# Patient Record
Sex: MALE | Race: WHITE | NOT HISPANIC OR LATINO | URBAN - METROPOLITAN AREA
[De-identification: names, ages, dates, MRNs, and addresses within clinical notes are randomized per-mention and may not be internally consistent; named-entity substitution may affect disease eponyms.]

---

## 2023-08-10 ENCOUNTER — HOSPITAL ENCOUNTER (OUTPATIENT)
Dept: DATA CONVERSION | Facility: HOSPITAL | Age: 68
Discharge: HOME | End: 2023-08-10

## 2023-08-10 DIAGNOSIS — Z12.5 ENCOUNTER FOR SCREENING FOR MALIGNANT NEOPLASM OF PROSTATE: ICD-10-CM

## 2023-08-10 DIAGNOSIS — Z13.220 ENCOUNTER FOR SCREENING FOR LIPOID DISORDERS: ICD-10-CM

## 2023-08-10 DIAGNOSIS — Z13.6 ENCOUNTER FOR SCREENING FOR CARDIOVASCULAR DISORDERS: ICD-10-CM

## 2023-08-10 DIAGNOSIS — Z13.1 ENCOUNTER FOR SCREENING FOR DIABETES MELLITUS: ICD-10-CM

## 2023-08-10 LAB
ALBUMIN SERPL-MCNC: 4.2 GM/DL (ref 3.5–5)
ALBUMIN/GLOB SERPL: 1.7 RATIO (ref 1.5–3)
ALP BLD-CCNC: 62 U/L (ref 35–125)
ALT SERPL-CCNC: 17 U/L (ref 5–40)
ANION GAP SERPL CALCULATED.3IONS-SCNC: 10 MMOL/L (ref 0–19)
APPEARANCE PLAS: CLEAR
AST SERPL-CCNC: 17 U/L (ref 5–40)
BILIRUB SERPL-MCNC: 0.8 MG/DL (ref 0.1–1.2)
BUN SERPL-MCNC: 17 MG/DL (ref 8–25)
BUN/CREAT SERPL: 15.5 RATIO (ref 8–21)
CALCIUM SERPL-MCNC: 9.8 MG/DL (ref 8.5–10.4)
CHLORIDE SERPL-SCNC: 104 MMOL/L (ref 97–107)
CHOLEST SERPL-MCNC: 205 MG/DL (ref 133–200)
CHOLEST/HDLC SERPL: 3.5 RATIO
CO2 SERPL-SCNC: 27 MMOL/L (ref 24–31)
COLOR SPUN FLD: YELLOW
CREAT SERPL-MCNC: 1.1 MG/DL (ref 0.4–1.6)
FASTING STATUS PATIENT QL REPORTED: ABNORMAL
GFR SERPL CREATININE-BSD FRML MDRD: 74 ML/MIN/1.73 M2
GLOBULIN SER-MCNC: 2.5 G/DL (ref 1.9–3.7)
GLUCOSE SERPL-MCNC: 90 MG/DL (ref 65–99)
HDLC SERPL-MCNC: 58 MG/DL
LDLC SERPL CALC-MCNC: 132 MG/DL (ref 65–130)
POTASSIUM SERPL-SCNC: 4.5 MMOL/L (ref 3.4–5.1)
PROT SERPL-MCNC: 6.7 G/DL (ref 5.9–7.9)
PSA SERPL-MCNC: 2.1 NG/ML (ref 0–4.1)
SODIUM SERPL-SCNC: 141 MMOL/L (ref 133–145)
TRIGL SERPL-MCNC: 75 MG/DL (ref 40–150)

## 2023-09-12 ENCOUNTER — HOSPITAL ENCOUNTER (OUTPATIENT)
Dept: DATA CONVERSION | Facility: HOSPITAL | Age: 68
Discharge: HOME | End: 2023-09-12
Payer: MEDICARE

## 2023-09-12 DIAGNOSIS — E78.00 PURE HYPERCHOLESTEROLEMIA, UNSPECIFIED: ICD-10-CM

## 2024-03-21 ASSESSMENT — ENCOUNTER SYMPTOMS
WEIGHT LOSS: 0
SHORTNESS OF BREATH: 0
FEVER: 0
SORE THROAT: 0
WHEEZING: 0
MYALGIAS: 0
HEMOPTYSIS: 0
RHINORRHEA: 0
COUGH: 1
HEARTBURN: 0
CHILLS: 0
HEADACHES: 0
SWEATS: 0

## 2024-03-22 ENCOUNTER — OFFICE VISIT (OUTPATIENT)
Dept: PRIMARY CARE | Facility: CLINIC | Age: 69
End: 2024-03-22
Payer: MEDICARE

## 2024-03-22 ENCOUNTER — HOSPITAL ENCOUNTER (OUTPATIENT)
Dept: RADIOLOGY | Facility: HOSPITAL | Age: 69
Discharge: HOME | End: 2024-03-22
Payer: MEDICARE

## 2024-03-22 VITALS
BODY MASS INDEX: 25.31 KG/M2 | HEART RATE: 64 BPM | WEIGHT: 189.2 LBS | OXYGEN SATURATION: 97 % | DIASTOLIC BLOOD PRESSURE: 70 MMHG | SYSTOLIC BLOOD PRESSURE: 134 MMHG

## 2024-03-22 DIAGNOSIS — R05.2 SUBACUTE COUGH: Primary | ICD-10-CM

## 2024-03-22 DIAGNOSIS — R05.2 SUBACUTE COUGH: ICD-10-CM

## 2024-03-22 PROBLEM — E78.00 PURE HYPERCHOLESTEROLEMIA: Status: ACTIVE | Noted: 2024-03-22

## 2024-03-22 PROBLEM — F41.8 DEPRESSION WITH ANXIETY: Status: ACTIVE | Noted: 2024-03-22

## 2024-03-22 PROBLEM — H00.016 HORDEOLUM EXTERNUM OF LEFT EYE: Status: ACTIVE | Noted: 2024-03-22

## 2024-03-22 PROBLEM — Z78.9 MEDICAL HOME PATIENT: Status: ACTIVE | Noted: 2024-03-22

## 2024-03-22 PROCEDURE — 99213 OFFICE O/P EST LOW 20 MIN: CPT | Performed by: INTERNAL MEDICINE

## 2024-03-22 PROCEDURE — 1126F AMNT PAIN NOTED NONE PRSNT: CPT | Performed by: INTERNAL MEDICINE

## 2024-03-22 PROCEDURE — 71046 X-RAY EXAM CHEST 2 VIEWS: CPT | Performed by: RADIOLOGY

## 2024-03-22 PROCEDURE — 1159F MED LIST DOCD IN RCRD: CPT | Performed by: INTERNAL MEDICINE

## 2024-03-22 PROCEDURE — 1036F TOBACCO NON-USER: CPT | Performed by: INTERNAL MEDICINE

## 2024-03-22 PROCEDURE — 1157F ADVNC CARE PLAN IN RCRD: CPT | Performed by: INTERNAL MEDICINE

## 2024-03-22 PROCEDURE — 71046 X-RAY EXAM CHEST 2 VIEWS: CPT

## 2024-03-22 RX ORDER — LAMOTRIGINE 25 MG/1
25 TABLET, ORALLY DISINTEGRATING ORAL DAILY
COMMUNITY

## 2024-03-22 RX ORDER — ALBUTEROL SULFATE 90 UG/1
2 AEROSOL, METERED RESPIRATORY (INHALATION) EVERY 4 HOURS
COMMUNITY
Start: 2023-08-08

## 2024-03-22 RX ORDER — DULOXETIN HYDROCHLORIDE 30 MG/1
30 CAPSULE, DELAYED RELEASE ORAL DAILY
COMMUNITY

## 2024-03-22 RX ORDER — MINERAL OIL
180 ENEMA (ML) RECTAL DAILY
COMMUNITY
Start: 2023-08-08

## 2024-03-22 ASSESSMENT — ENCOUNTER SYMPTOMS
LOSS OF SENSATION IN FEET: 0
COUGH: 0
CHILLS: 0
SHORTNESS OF BREATH: 0
UNEXPECTED WEIGHT CHANGE: 0
WHEEZING: 0
FEVER: 0
OCCASIONAL FEELINGS OF UNSTEADINESS: 0
SINUS PAIN: 0
RHINORRHEA: 1
DEPRESSION: 0
FATIGUE: 0
CHOKING: 0
PALPITATIONS: 0

## 2024-03-22 ASSESSMENT — PAIN SCALES - GENERAL: PAINLEVEL: 0-NO PAIN

## 2024-03-22 ASSESSMENT — PATIENT HEALTH QUESTIONNAIRE - PHQ9
2. FEELING DOWN, DEPRESSED OR HOPELESS: NOT AT ALL
1. LITTLE INTEREST OR PLEASURE IN DOING THINGS: NOT AT ALL
SUM OF ALL RESPONSES TO PHQ9 QUESTIONS 1 AND 2: 0

## 2024-03-22 NOTE — PROGRESS NOTES
Subjective   Patient ID: Yovani Bledsoe is a 69 y.o. male who presents for ONGOING COUGH.    Cough--went away with treatment last year but in last month and a half has come back.  Worse at night. Has been trying otc allergy pill but not the inhaler. Neg for covid yesterday. No other URI symptoms than cough. Denies SOB or wheezing.  Began before went to [place] , continued in [place] and since returned has ocntinued. Went to  in [place] and told him to get steroid NS--didn't do CXR.  No pain in calves or swelling ankles. No change in weight. Denies any heartburn symptoms. Denies palpitations.           Review of Systems   Constitutional:  Negative for chills, fatigue, fever and unexpected weight change.   HENT:  Positive for rhinorrhea. Negative for congestion and sinus pain.    Respiratory:  Negative for cough, choking, shortness of breath and wheezing.    Cardiovascular:  Negative for chest pain, palpitations and leg swelling.       Objective   /70 (BP Location: Left arm, Patient Position: Lying)   Pulse 64   Wt 85.8 kg (189 lb 3.2 oz)   SpO2 97%   BMI 25.31 kg/m²     Physical Exam  Constitutional:       General: He is not in acute distress.     Appearance: Normal appearance. He is normal weight. He is not ill-appearing.   HENT:      Right Ear: Tympanic membrane normal.      Left Ear: Tympanic membrane normal.      Nose: No congestion or rhinorrhea.      Mouth/Throat:      Pharynx: Oropharynx is clear.   Cardiovascular:      Rate and Rhythm: Normal rate and regular rhythm.      Heart sounds: No murmur heard.  Pulmonary:      Effort: Pulmonary effort is normal.      Breath sounds: Normal breath sounds. No wheezing, rhonchi or rales.   Musculoskeletal:      Right lower leg: No edema.      Left lower leg: No edema.   Lymphadenopathy:      Cervical: No cervical adenopathy.         Assessment/Plan   Diagnoses and all orders for this visit:  Subacute cough  -     XR chest 2 views; Future    Advised resuming  allegra daily and trying albuterol inhaler before bed. Further plans after xray available

## 2024-09-30 ENCOUNTER — HOSPITAL ENCOUNTER (OUTPATIENT)
Dept: RADIOLOGY | Facility: HOSPITAL | Age: 69
Discharge: HOME | End: 2024-09-30
Payer: MEDICARE

## 2024-09-30 DIAGNOSIS — M75.122 COMPLETE ROTATOR CUFF TEAR OR RUPTURE OF LEFT SHOULDER, NOT SPECIFIED AS TRAUMATIC: ICD-10-CM

## 2024-09-30 PROCEDURE — 73221 MRI JOINT UPR EXTREM W/O DYE: CPT | Mod: LT

## 2024-09-30 PROCEDURE — 73221 MRI JOINT UPR EXTREM W/O DYE: CPT | Mod: LEFT SIDE | Performed by: RADIOLOGY

## 2024-10-24 NOTE — PROGRESS NOTES
Physical Therapy Evaluation and Treatment     Patient Name: Yovani Bledsoe  MRN: [id number]  Encounter date: 10/25/2024  Time Calculation  Start Time: 0930  Stop Time: 0959  Time Calculation (min): 29 min  PT Evaluation Time Entry  PT Evaluation (Low) Time Entry: 20  PT Therapeutic Procedures Time Entry  Therapeutic Activity Time Entry: 9  Low complexity due to patient's clinical presentation being stable and uncomplicated by any significant comorbidities that may affect rehab tolerance and progression.     Visit # 1 of 9  Visits/Dates Authorized: MEDICARE A/B - NO AUTH / $240 DEDUCT MET / 80% Coins / [place] visits / $0 used 2024 PT/ST.   Insurance Type: Payor: MEDICARE / Plan: MEDICARE PART A AND B / Product Type: *No Product type* /     Current Problem:   Problem List Items Addressed This Visit    None  Visit Diagnoses         Codes    Chronic left shoulder pain    -  Primary M25.512, G89.29    Relevant Orders    Follow Up In Physical Therapy    Unspecified rotator cuff tear or rupture of left shoulder, not specified as traumatic     M75.102    Relevant Orders    Follow Up In Physical Therapy    Tear of left rotator cuff, unspecified tear extent, unspecified whether traumatic     M75.102          Precautions:  Precautions  Precautions Comment: None       Subjective    Subjective Evaluation    History of Present Illness  Date of onset: 12/1/2022  Mechanism of injury: Pt presents to therapy with L shoulder pain, suspected RTC tear who recent Mri did not demonstrate any acute tears however tendinosis of bicep and supraspinatus is present. Pt reports he hurt it playing basketball in December of 2022, he was reaching up and his arm was pulled back from another player. He thought it was just hyperextended but the arm has gotten progressively weak and uncomfortable. He would like to get back to weight training and overall build strength. Denies paresthesia. He is still playing pickle ball 3-4x per week but only uses his  right arm.     Quality of life: good    Pain  Current pain ratin  At best pain ratin  At worst pain ratin  Location: L shoulder  Quality: dull ache, tight and pulling    Hand dominance: right    Diagnostic Tests  MRI studies: normal (IMPRESSION: Mild biceps tendinosis.    Mild acromioclavicular osteoarthritis with some mild subacromial subdeltoid bursitis.    Mild supraspinatus tendinosis.)    Treatments  No previous or current treatments  Patient Goals  Patient goals for therapy: increased strength, decreased pain, return to sport/leisure activities and increased motion  Patient goal: Return to weight training, pickle ball and general sports activities         Objective      Objective     Postural Observations  Seated posture: fair    Additional Postural Observation Details  Forward shoulder position     Palpation   Left   Tenderness of the biceps.   Trigger point to biceps.     Active Range of Motion   Left Shoulder   Flexion: 125 degrees with pain  Abduction: 130 degrees with pain  External rotation 0°: 40 degrees with pain  External rotation BTH: C5   Internal rotation 0°: WFL  Internal rotation BTB: L1 with pain    Passive Range of Motion   Left Shoulder   Normal passive range of motion    Additional Passive Range of Motion Details  Grossly equal to AROM     Scapular Mobility   Left Shoulder   Scapular mobility: WFL  Scapular Mobility with Shoulder to 90° FF   Scapular winging: minimal  Scapular elevation: minimal    Joint Play     Additional Joint Play Details  WNL    Strength/Myotome Testing     Left Shoulder     Planes of Motion   Flexion: WFL   Extension: WFL   Abduction: WFL   External rotation at 90°: WFL   Internal rotation at 90°: WFL     Isolated Muscles   Lower trapezius: 4+   Middle trapezius: 4+     Additional Strength Details  Pain with ER/IR testing however good muscle contraction      Outcome Measures:  Other Measures  Other Outcome Measures: UEFS 65     Treatments:  Therapeutic  Activity  Therapeutic Activity Performed: Yes  Therapeutic Activity 1: Educated on anatomy in relation to findings  Therapeutic Activity 2: Educated on PT interventions including KT tape, Dry needling and modalities such as UV light    HEP / Access Codes:   Access Code: [email or web address]  URL: [email or web address]  Date: 10/25/2024  Prepared by: Mahi Meléndez    Exercises  - Seated Scapular Retraction  - 1 x daily - 7 x weekly - 3 sets - 10 reps  - Prone Shoulder Horizontal Abduction  - 1 x daily - 7 x weekly - 3 sets - 10 reps  - Prone Shoulder Flexion  - 1 x daily - 7 x weekly - 3 sets - 10 reps  - Shoulder External Rotation with Anchored Resistance  - 1 x daily - 7 x weekly - 3 sets - 10 reps  - Standing Shoulder Row with Anchored Resistance  - 1 x daily - 7 x weekly - 3 sets - 10 reps    Assessment   Assessment & Plan     Assessment  Impairments: abnormal coordination, abnormal muscle tone, abnormal or restricted ROM, activity intolerance, impaired physical strength, lacks appropriate home exercise program and pain with function  Assessment details: Pt is a 69 y.o male presenting to therapy with L shoulder pain. Pt demonstrated functional ROM however limits and pain were noted at current end range with flexion and abduction along with palpable tenderness along proximal bicep which does align with MRI findings of chronic tendinosis. Pt also demonstrated reduction in postural and posterior chain strength with slight scapular winging and forward shoulder position creating further compression through anterior shoulder structures. At this time pt would benefit from skilled physical therapy in order to prevent further functional decline.   Barriers to therapy: n/a  Prognosis: good  Prognosis details: Good activity level     Goals  Return to weight training, pickle ball and general sports activities    Plan  Therapy options: will be seen for skilled physical therapy services  Planned modality interventions: low level  laser therapy and TENS  Other planned modality interventions: KT tape/Dry needling  Planned therapy interventions: manual therapy, abdominal trunk stabilization, motor coordination training, muscle pump exercises, neuromuscular re-education, body mechanics training, postural training, soft tissue mobilization, fine motor coordination training, flexibility, strengthening, functional ROM exercises, gait training, stretching, spinal/joint mobilization, therapeutic activities, home exercise program and joint mobilization  Frequency: 1x week  Duration in visits: 8  Duration in weeks: 9  Treatment plan discussed with: patient           Goals:   Active       LTG       Pt will be 100% IND with HEP in 8 weeks in order to maintain progress with therapy.         Start:  10/25/24    Expected End:  12/29/24            Pt will reduce pain levels to no more than 0/10 in 8 weeks in order to return to recreational activities.         Start:  10/25/24    Expected End:  12/29/24            Pt will demonstrate good postural and RTC control with return to recreational activities including basketball, pickle ball, weight lifting and kayaking in 8 weeks to prevent reoccurrence of injury       Start:  10/25/24    Expected End:  12/29/24            Pt will demonstrate subjective improvement of ADLs and recreational activities through improved score of 80 on UEFS in 8 weeks for return to recreational activities. .        Start:  10/25/24    Expected End:  12/29/24               STG       Pt will be 50% IND with HEP in 4 weeks in order to progress with therapy.        Start:  10/25/24    Expected End:  11/29/24            Pt will demonstrated improve AROM of L GH as follows: equal to R in 4 weeks to improve mobility required for dressing, bathing, cooking and cleaning.        Start:  10/25/24    Expected End:  11/29/24            Pt will improve R GH and scapular strength to 5/5 in 4 weeks in order to improve strength required to lift objects  at home including groceries and to improve cleaning tasks.         Start:  10/25/24    Expected End:  11/29/24            Pt will demonstrate subjective improvement of ADLs and recreational activities through improved score of 75 on UEFS in 4 weeks for gentle return to recreational sport activities.        Start:  10/25/24    Expected End:  11/29/24

## 2024-10-25 ENCOUNTER — EVALUATION (OUTPATIENT)
Dept: PHYSICAL THERAPY | Facility: CLINIC | Age: 69
End: 2024-10-25
Payer: MEDICARE

## 2024-10-25 DIAGNOSIS — M75.102 TEAR OF LEFT ROTATOR CUFF, UNSPECIFIED TEAR EXTENT, UNSPECIFIED WHETHER TRAUMATIC: ICD-10-CM

## 2024-10-25 DIAGNOSIS — G89.29 CHRONIC LEFT SHOULDER PAIN: Primary | ICD-10-CM

## 2024-10-25 DIAGNOSIS — M25.512 CHRONIC LEFT SHOULDER PAIN: Primary | ICD-10-CM

## 2024-10-25 DIAGNOSIS — M75.102 UNSPECIFIED ROTATOR CUFF TEAR OR RUPTURE OF LEFT SHOULDER, NOT SPECIFIED AS TRAUMATIC: ICD-10-CM

## 2024-10-25 PROCEDURE — 97161 PT EVAL LOW COMPLEX 20 MIN: CPT | Mod: GP | Performed by: PHYSICAL THERAPIST

## 2024-10-25 PROCEDURE — 97530 THERAPEUTIC ACTIVITIES: CPT | Mod: GP | Performed by: PHYSICAL THERAPIST

## 2024-10-25 SDOH — ECONOMIC STABILITY: GENERAL: QUALITY OF LIFE: GOOD

## 2024-10-25 ASSESSMENT — ENCOUNTER SYMPTOMS
PAIN SCALE AT LOWEST: 0
PAIN SCALE: 0
QUALITY: DULL ACHE
QUALITY: TIGHT
PAIN SCALE AT HIGHEST: 5
PAIN LOCATION: L SHOULDER
QUALITY: PULLING

## 2024-10-28 ENCOUNTER — TREATMENT (OUTPATIENT)
Dept: PHYSICAL THERAPY | Facility: CLINIC | Age: 69
End: 2024-10-28
Payer: MEDICARE

## 2024-10-28 DIAGNOSIS — G89.29 CHRONIC LEFT SHOULDER PAIN: ICD-10-CM

## 2024-10-28 DIAGNOSIS — M75.102 NONTRAUMATIC TEAR OF LEFT ROTATOR CUFF, UNSPECIFIED TEAR EXTENT: Primary | ICD-10-CM

## 2024-10-28 DIAGNOSIS — M75.102 UNSPECIFIED ROTATOR CUFF TEAR OR RUPTURE OF LEFT SHOULDER, NOT SPECIFIED AS TRAUMATIC: ICD-10-CM

## 2024-10-28 DIAGNOSIS — M75.102 TEAR OF LEFT ROTATOR CUFF, UNSPECIFIED TEAR EXTENT, UNSPECIFIED WHETHER TRAUMATIC: ICD-10-CM

## 2024-10-28 DIAGNOSIS — M25.512 CHRONIC LEFT SHOULDER PAIN: ICD-10-CM

## 2024-10-28 PROCEDURE — 97140 MANUAL THERAPY 1/> REGIONS: CPT | Mod: GP,CQ

## 2024-10-28 PROCEDURE — 97110 THERAPEUTIC EXERCISES: CPT | Mod: GP,CQ

## 2024-11-06 ENCOUNTER — TREATMENT (OUTPATIENT)
Dept: PHYSICAL THERAPY | Facility: CLINIC | Age: 69
End: 2024-11-06
Payer: MEDICARE

## 2024-11-06 DIAGNOSIS — M75.102 NONTRAUMATIC TEAR OF LEFT ROTATOR CUFF, UNSPECIFIED TEAR EXTENT: Primary | ICD-10-CM

## 2024-11-06 DIAGNOSIS — G89.29 CHRONIC LEFT SHOULDER PAIN: ICD-10-CM

## 2024-11-06 DIAGNOSIS — M75.102 UNSPECIFIED ROTATOR CUFF TEAR OR RUPTURE OF LEFT SHOULDER, NOT SPECIFIED AS TRAUMATIC: ICD-10-CM

## 2024-11-06 DIAGNOSIS — M25.512 CHRONIC LEFT SHOULDER PAIN: ICD-10-CM

## 2024-11-06 PROCEDURE — 97110 THERAPEUTIC EXERCISES: CPT | Mod: GP,CQ

## 2024-11-06 PROCEDURE — 97140 MANUAL THERAPY 1/> REGIONS: CPT | Mod: GP,CQ

## 2024-11-06 NOTE — PROGRESS NOTES
Physical Therapy Treatment    Patient Name: Yovani Bledsoe  MRN: [id number]  Today's Date: 11/6/2024  Time Calculation  Start Time: 0930  Stop Time: 1010  Time Calculation (min): 40 min  PT Therapeutic Procedures Time Entry  Manual Therapy Time Entry: 15  Therapeutic Exercise Time Entry: 25    Insurance:  Visit number: 3 of 9  Authorization info: 10/24/24:  MEDICARE A/B - NO AUTH / $240 DEDUCT MET / 80% Coins / [place] visits / $0 used 2024 PT/STJulien ALVARENGA  SUPP - Active / Per RTE / ds    Insurance Type: Payor: MEDICARE / Plan: MEDICARE PART A AND B / Product Type: *No Product type* /     Current Problem   1. Nontraumatic tear of left rotator cuff, unspecified tear extent        2. Unspecified rotator cuff tear or rupture of left shoulder, not specified as traumatic  Follow Up In Physical Therapy      3. Chronic left shoulder pain  Follow Up In Physical Therapy          Subjective   General    Pt reports slight improvement but still having some discomfort along L proximal bicep with outreached arm position.  Precautions:     Pain    0  Post Treatment Pain Level 0    Objective   L anterior scapular tipping  L scapular winging    Treatments:  Therapeutic Exercise:  Bicep curls in supination 2 x 15 #5   Neutral bicep curls 2 x 15 #5  Upper cuts in 90/90 #5  45 degree lat pulls  dark blue TB x 30  SPO #5 x 30     Manual:   DN:  IDN performed this date. Pt educated on risks and benefits of dry needling. Pt provided verbal consent. All universal precautions followed.    3 - 30 mm R proximal bicep    No adverse response. All IDN guidelines and safety protocols followed.      K-taping to promote better scapular positioning.    Assessment   Assessment:    Pt tolerated session without discomfort. Focusing on scapular strengthening.    Plan:    Continue with POC    OP EDUCATION:   Added to HEP.    Goals:   Active       LTG       Pt will be 100% IND with HEP in 8 weeks in order to maintain progress with therapy.         Start:   10/25/24    Expected End:  12/29/24            Pt will reduce pain levels to no more than 0/10 in 8 weeks in order to return to recreational activities.         Start:  10/25/24    Expected End:  12/29/24            Pt will demonstrate good postural and RTC control with return to recreational activities including basketball, pickle ball, weight lifting and kayaking in 8 weeks to prevent reoccurrence of injury       Start:  10/25/24    Expected End:  12/29/24            Pt will demonstrate subjective improvement of ADLs and recreational activities through improved score of 80 on UEFS in 8 weeks for return to recreational activities. .        Start:  10/25/24    Expected End:  12/29/24               STG       Pt will be 50% IND with HEP in 4 weeks in order to progress with therapy.        Start:  10/25/24    Expected End:  11/29/24            Pt will demonstrated improve AROM of L GH as follows: equal to R in 4 weeks to improve mobility required for dressing, bathing, cooking and cleaning.        Start:  10/25/24    Expected End:  11/29/24            Pt will improve R GH and scapular strength to 5/5 in 4 weeks in order to improve strength required to lift objects at home including groceries and to improve cleaning tasks.         Start:  10/25/24    Expected End:  11/29/24            Pt will demonstrate subjective improvement of ADLs and recreational activities through improved score of 75 on UEFS in 4 weeks for gentle return to recreational sport activities.        Start:  10/25/24    Expected End:  11/29/24

## 2024-11-12 ENCOUNTER — OFFICE VISIT (OUTPATIENT)
Dept: PRIMARY CARE | Facility: CLINIC | Age: 69
End: 2024-11-12
Payer: MEDICARE

## 2024-11-12 VITALS
DIASTOLIC BLOOD PRESSURE: 82 MMHG | SYSTOLIC BLOOD PRESSURE: 124 MMHG | WEIGHT: 192 LBS | HEIGHT: 74 IN | BODY MASS INDEX: 24.64 KG/M2 | HEART RATE: 87 BPM | OXYGEN SATURATION: 98 %

## 2024-11-12 DIAGNOSIS — E78.00 PURE HYPERCHOLESTEROLEMIA: ICD-10-CM

## 2024-11-12 DIAGNOSIS — Z00.00 MEDICARE ANNUAL WELLNESS VISIT, SUBSEQUENT: Primary | ICD-10-CM

## 2024-11-12 DIAGNOSIS — F41.8 DEPRESSION WITH ANXIETY: ICD-10-CM

## 2024-11-12 PROCEDURE — G0439 PPPS, SUBSEQ VISIT: HCPCS | Performed by: INTERNAL MEDICINE

## 2024-11-12 PROCEDURE — 1123F ACP DISCUSS/DSCN MKR DOCD: CPT | Performed by: INTERNAL MEDICINE

## 2024-11-12 PROCEDURE — 1159F MED LIST DOCD IN RCRD: CPT | Performed by: INTERNAL MEDICINE

## 2024-11-12 PROCEDURE — 99215 OFFICE O/P EST HI 40 MIN: CPT | Mod: 25 | Performed by: INTERNAL MEDICINE

## 2024-11-12 PROCEDURE — 1125F AMNT PAIN NOTED PAIN PRSNT: CPT | Performed by: INTERNAL MEDICINE

## 2024-11-12 PROCEDURE — 3008F BODY MASS INDEX DOCD: CPT | Performed by: INTERNAL MEDICINE

## 2024-11-12 PROCEDURE — 1157F ADVNC CARE PLAN IN RCRD: CPT | Performed by: INTERNAL MEDICINE

## 2024-11-12 PROCEDURE — 90714 TD VACC NO PRESV 7 YRS+ IM: CPT | Performed by: INTERNAL MEDICINE

## 2024-11-12 PROCEDURE — 1170F FXNL STATUS ASSESSED: CPT | Performed by: INTERNAL MEDICINE

## 2024-11-12 PROCEDURE — 1036F TOBACCO NON-USER: CPT | Performed by: INTERNAL MEDICINE

## 2024-11-12 PROCEDURE — 1158F ADVNC CARE PLAN TLK DOCD: CPT | Performed by: INTERNAL MEDICINE

## 2024-11-12 ASSESSMENT — ENCOUNTER SYMPTOMS
COUGH: 0
OCCASIONAL FEELINGS OF UNSTEADINESS: 0
CHILLS: 0
FEVER: 0
PALPITATIONS: 0
LOSS OF SENSATION IN FEET: 0
CHEST TIGHTNESS: 0
ABDOMINAL PAIN: 0
SORE THROAT: 0
HEADACHES: 0
VOMITING: 0
JOINT SWELLING: 0
EYE PAIN: 0
PSYCHIATRIC NEGATIVE: 1
ARTHRALGIAS: 0
COLOR CHANGE: 0
DEPRESSION: 0
DIZZINESS: 0
POLYDIPSIA: 0
DIARRHEA: 0
FREQUENCY: 0
ACTIVITY CHANGE: 0
NUMBNESS: 0
SHORTNESS OF BREATH: 0
DYSURIA: 0
CONSTIPATION: 0
NAUSEA: 0
FATIGUE: 0

## 2024-11-12 ASSESSMENT — PATIENT HEALTH QUESTIONNAIRE - PHQ9
SUM OF ALL RESPONSES TO PHQ9 QUESTIONS 1 AND 2: 0
1. LITTLE INTEREST OR PLEASURE IN DOING THINGS: NOT AT ALL
2. FEELING DOWN, DEPRESSED OR HOPELESS: NOT AT ALL

## 2024-11-12 ASSESSMENT — ACTIVITIES OF DAILY LIVING (ADL)
DRESSING: INDEPENDENT
BATHING: INDEPENDENT
MANAGING_FINANCES: INDEPENDENT
DOING_HOUSEWORK: INDEPENDENT
GROCERY_SHOPPING: INDEPENDENT
TAKING_MEDICATION: INDEPENDENT

## 2024-11-12 ASSESSMENT — PAIN SCALES - GENERAL: PAINLEVEL_OUTOF10: 2

## 2024-11-12 NOTE — PROGRESS NOTES
"Subjective   Patient ID: Yovani Bledsoe is a 69 y.o. male who presents for Annual Exam.    Hyperlipidemia-Lab Results       Component                Value               Date                       LDLCALC                  132 (H)             08/10/2023            Depression with anxiety-taking duloxitine --helps a lot with anxiety-can live with the sexual SE     Exercise--pickle ball 3x/week; [place], hikes and golf summer; [place] swim [place]        Diet--healthy      Had both his cataracts this year-R eye funky-has second cataract under first, L is great--now they want to lift his eyelids-droopy and hitting his head    In therapy for left shoulder-told  bursitis-had cortisone first    Colonoscopy-2 yrs ago    Sees derm 2x/yr due to pre-cancerous lesions       Review of Systems   Constitutional:  Negative for activity change, chills, fatigue and fever.   HENT:  Positive for hearing loss (struggling in crowds). Negative for ear pain and sore throat.    Eyes:  Positive for visual disturbance. Negative for pain.   Respiratory:  Negative for cough (cough finally resolved), chest tightness and shortness of breath.    Cardiovascular:  Negative for chest pain, palpitations and leg swelling.   Gastrointestinal:  Negative for abdominal pain, constipation, diarrhea, nausea and vomiting.   Endocrine: Negative for polydipsia and polyuria.   Genitourinary:  Negative for dysuria and frequency.        Sexual SE from duloxitine   Musculoskeletal:  Negative for arthralgias and joint swelling.   Skin:  Negative for color change and rash.   Neurological:  Negative for dizziness, numbness and headaches.   Psychiatric/Behavioral: Negative.         Objective   /82   Pulse 87   Ht 1.88 m (6' 2\")   Wt 87.1 kg (192 lb)   SpO2 98%   BMI 24.65 kg/m²     Physical Exam  Vitals reviewed.   Constitutional:       General: He is not in acute distress.     Appearance: Normal appearance.   Cardiovascular: "      Rate and Rhythm: Normal rate and regular rhythm.      Heart sounds: Normal heart sounds. No murmur heard.     No gallop.   Pulmonary:      Breath sounds: Normal breath sounds. No wheezing, rhonchi or rales.   Skin:     General: Skin is warm and dry.      Findings: No rash.      Comments: Defer to derm   Neurological:      General: No focal deficit present.      Mental Status: He is alert and oriented to person, place, and time.   Psychiatric:         Mood and Affect: Mood normal.       Assessment/Plan   Assessment & Plan  Medicare annual wellness visit, subsequent   Defer blood work to next year      will check his hearing with formal testing   Will update tetanus today  Pure hypercholesterolemia         Depression with anxiety  Discussed changing to buspar from duloxitine but doesn't want to change at this time         Current Outpatient Medications:     DULoxetine (Cymbalta) 30 mg DR capsule, Take 1 capsule (30 mg) by mouth once daily., Disp: , Rfl:     multivit-min-folic acid-vit K 400-40 mcg capsule, as directed Orally, Disp: , Rfl:

## 2024-11-13 ENCOUNTER — TREATMENT (OUTPATIENT)
Dept: PHYSICAL THERAPY | Facility: CLINIC | Age: 69
End: 2024-11-13
Payer: MEDICARE

## 2024-11-13 DIAGNOSIS — G89.29 CHRONIC LEFT SHOULDER PAIN: ICD-10-CM

## 2024-11-13 DIAGNOSIS — M75.112 NONTRAUMATIC INCOMPLETE TEAR OF LEFT ROTATOR CUFF: Primary | ICD-10-CM

## 2024-11-13 DIAGNOSIS — M75.102 UNSPECIFIED ROTATOR CUFF TEAR OR RUPTURE OF LEFT SHOULDER, NOT SPECIFIED AS TRAUMATIC: ICD-10-CM

## 2024-11-13 DIAGNOSIS — M25.512 CHRONIC LEFT SHOULDER PAIN: ICD-10-CM

## 2024-11-13 PROCEDURE — 97110 THERAPEUTIC EXERCISES: CPT | Mod: GP,CQ

## 2024-11-13 NOTE — PROGRESS NOTES
Physical Therapy Treatment    Patient Name: Yovani Bledsoe  MRN: [id number]  Today's Date: 11/13/2024  Time Calculation  Start Time: 0920  Stop Time: 1000  Time Calculation (min): 40 min  PT Therapeutic Procedures Time Entry  Therapeutic Exercise Time Entry: 40    Insurance:  Visit number: 4 of 9  Authorization info: 10/24/24:  MEDICARE A/B - NO AUTH / $240 DEDUCT MET / 80% Coins / [place] visits / $0 used 2024 PT/ST.   [facility] / Per [facility] / ds    Insurance Type: Payor: MEDICARE / Plan: MEDICARE PART A AND B / Product Type: *No Product type* /     Current Problem   1. Nontraumatic incomplete tear of left rotator cuff        2. Unspecified rotator cuff tear or rupture of left shoulder, not specified as traumatic  Follow Up In Physical Therapy      3. Chronic left shoulder pain  Follow Up In Physical Therapy          Subjective   General    Pt has been back to playing [employer] ball without too many issues with his L shoulder. Still gets the occasional twinge along the anterior shoulder with certain movements.  Precautions:   None  Pain    0-2  Post Treatment Pain Level 0    Objective   L shoulder AROM WNL    Treatments:  Therapeutic Exercise:  UBE  LA Scap add with PBTB x 25  45 degre lat pulls with PBTB x 25  ER with PBTB 2 x 15   Scaption #5 2 x 15   Bicep curls in supination 2 x 15 #5   Neutral bicep curls 2 x 15 #5  Upper cuts in 90/90 #5  Horizontal ABD eccentric control with MGTB 2 x 10  Ball circles          Assessment   Assessment:    Pt able to apply K-tape to shoulder at home. L shoulder strength continues to improve.     Plan:    Continue with POC    OP EDUCATION:       Goals:   Active       LTG       Pt will be 100% IND with HEP in 8 weeks in order to maintain progress with therapy.         Start:  10/25/24    Expected End:  12/29/24            Pt will reduce pain levels to no more than 0/10 in 8 weeks in order to return to recreational activities.         Start:  10/25/24    Expected End:  12/29/24             Pt will demonstrate good postural and RTC control with return to recreational activities including basketball, pickle ball, weight lifting and kayaking in 8 weeks to prevent reoccurrence of injury       Start:  10/25/24    Expected End:  12/29/24            Pt will demonstrate subjective improvement of ADLs and recreational activities through improved score of 80 on UEFS in 8 weeks for return to recreational activities. .        Start:  10/25/24    Expected End:  12/29/24               STG       Pt will be 50% IND with HEP in 4 weeks in order to progress with therapy.        Start:  10/25/24    Expected End:  11/29/24            Pt will demonstrated improve AROM of L GH as follows: equal to R in 4 weeks to improve mobility required for dressing, bathing, cooking and cleaning.        Start:  10/25/24    Expected End:  11/29/24            Pt will improve R GH and scapular strength to 5/5 in 4 weeks in order to improve strength required to lift objects at home including groceries and to improve cleaning tasks.         Start:  10/25/24    Expected End:  11/29/24            Pt will demonstrate subjective improvement of ADLs and recreational activities through improved score of 75 on UEFS in 4 weeks for gentle return to recreational sport activities.        Start:  10/25/24    Expected End:  11/29/24

## 2024-11-20 ENCOUNTER — TREATMENT (OUTPATIENT)
Dept: PHYSICAL THERAPY | Facility: CLINIC | Age: 69
End: 2024-11-20
Payer: MEDICARE

## 2024-11-20 DIAGNOSIS — M25.512 CHRONIC LEFT SHOULDER PAIN: ICD-10-CM

## 2024-11-20 DIAGNOSIS — G89.29 CHRONIC LEFT SHOULDER PAIN: ICD-10-CM

## 2024-11-20 DIAGNOSIS — M75.112 NONTRAUMATIC INCOMPLETE TEAR OF LEFT ROTATOR CUFF: Primary | ICD-10-CM

## 2024-11-20 DIAGNOSIS — M75.102 UNSPECIFIED ROTATOR CUFF TEAR OR RUPTURE OF LEFT SHOULDER, NOT SPECIFIED AS TRAUMATIC: ICD-10-CM

## 2024-11-20 PROCEDURE — 97110 THERAPEUTIC EXERCISES: CPT | Mod: GP,CQ

## 2024-11-20 NOTE — PROGRESS NOTES
Physical Therapy Treatment    Patient Name: Yovani Bledsoe  MRN: [id number]  Today's Date: 11/20/2024  Time Calculation  Start Time: 0800  Stop Time: 0825  Time Calculation (min): 25 min  PT Therapeutic Procedures Time Entry  Therapeutic Exercise Time Entry: 25    Insurance:  Visit number: 5 of 9  Authorization info: 10/24/24:  MEDICARE A/B - NO AUTH / $240 DEDUCT MET / 80% Coins / [place] visits / $0 [facility]24 [facility] / Per [facility] / ds    Insurance Type: Payor: MEDICARE / Plan: MEDICARE PART A AND B / Product Type: *No Product type* /     Current Problem   1. Nontraumatic incomplete tear of left rotator cuff        2. Unspecified rotator cuff tear or rupture of left shoulder, not specified as traumatic  Follow Up In Physical Therapy      3. Chronic left shoulder pain  Follow Up In Physical Therapy          Subjective   General    Pt reports that he is doing really well. No issues with Pickle ball.  Precautions:   none  Pain    0  Post Treatment Pain Level 0    Objective   L shoulder AROM WNL    Treatments:  Therapeutic Exercise:   UBE  LA Scap add with PBTB x 25  ER iso with flexion MGTB 2 x 15   ER at 90/90 with OTB  D2 flexion with OTB x 25    Pt instructed in J-band warm-up routine to be used prior to pickle ball games        Assessment   Assessment:    Pt tolerated session well with minor cueing for technique.    Plan:    Pt will return in 2 weeks to check on how things are going with his HEP/progress.    OP EDUCATION:   Updated HEP    Goals:   Active       LTG       Pt will be 100% IND with HEP in 8 weeks in order to maintain progress with therapy.         Start:  10/25/24    Expected End:  12/29/24            Pt will reduce pain levels to no more than 0/10 in 8 weeks in order to return to recreational activities.         Start:  10/25/24    Expected End:  12/29/24            Pt will demonstrate good postural and RTC control with return to recreational activities including basketball, pickle  ball, weight lifting and kayaking in 8 weeks to prevent reoccurrence of injury       Start:  10/25/24    Expected End:  12/29/24            Pt will demonstrate subjective improvement of ADLs and recreational activities through improved score of 80 on UEFS in 8 weeks for return to recreational activities. .        Start:  10/25/24    Expected End:  12/29/24               STG       Pt will be 50% IND with HEP in 4 weeks in order to progress with therapy.        Start:  10/25/24    Expected End:  11/29/24            Pt will demonstrated improve AROM of L GH as follows: equal to R in 4 weeks to improve mobility required for dressing, bathing, cooking and cleaning.        Start:  10/25/24    Expected End:  11/29/24            Pt will improve R GH and scapular strength to 5/5 in 4 weeks in order to improve strength required to lift objects at home including groceries and to improve cleaning tasks.         Start:  10/25/24    Expected End:  11/29/24            Pt will demonstrate subjective improvement of ADLs and recreational activities through improved score of 75 on UEFS in 4 weeks for gentle return to recreational sport activities.        Start:  10/25/24    Expected End:  11/29/24

## 2024-11-27 ENCOUNTER — APPOINTMENT (OUTPATIENT)
Dept: PHYSICAL THERAPY | Facility: CLINIC | Age: 69
End: 2024-11-27
Payer: MEDICARE

## 2024-12-03 ENCOUNTER — PATIENT MESSAGE (OUTPATIENT)
Dept: PRIMARY CARE | Facility: CLINIC | Age: 69
End: 2024-12-03
Payer: MEDICARE

## 2024-12-03 DIAGNOSIS — F41.8 DEPRESSION WITH ANXIETY: Primary | ICD-10-CM

## 2024-12-03 RX ORDER — DULOXETIN HYDROCHLORIDE 30 MG/1
30 CAPSULE, DELAYED RELEASE ORAL DAILY
Qty: 90 CAPSULE | Refills: 3 | Status: SHIPPED | OUTPATIENT
Start: 2024-12-03

## 2024-12-04 ENCOUNTER — APPOINTMENT (OUTPATIENT)
Dept: PHYSICAL THERAPY | Facility: CLINIC | Age: 69
End: 2024-12-04
Payer: MEDICARE

## 2024-12-11 ENCOUNTER — TREATMENT (OUTPATIENT)
Dept: PHYSICAL THERAPY | Facility: CLINIC | Age: 69
End: 2024-12-11
Payer: MEDICARE

## 2024-12-11 DIAGNOSIS — M75.102 UNSPECIFIED ROTATOR CUFF TEAR OR RUPTURE OF LEFT SHOULDER, NOT SPECIFIED AS TRAUMATIC: ICD-10-CM

## 2024-12-11 DIAGNOSIS — G89.29 CHRONIC LEFT SHOULDER PAIN: ICD-10-CM

## 2024-12-11 DIAGNOSIS — M25.512 CHRONIC LEFT SHOULDER PAIN: ICD-10-CM

## 2024-12-18 ENCOUNTER — DOCUMENTATION (OUTPATIENT)
Dept: PHYSICAL THERAPY | Facility: CLINIC | Age: 69
End: 2024-12-18
Payer: MEDICARE

## 2024-12-18 ENCOUNTER — APPOINTMENT (OUTPATIENT)
Dept: PHYSICAL THERAPY | Facility: CLINIC | Age: 69
End: 2024-12-18
Payer: MEDICARE

## 2024-12-18 NOTE — PROGRESS NOTES
Physical Therapy    Discharge Summary    Name: Yovani Bledsoe  MRN: [id number]  : 1955  Date: 24    Discharge Summary: PT    Discharge Information: Date of discharge 2024, Date of last visit 24, and Date of evaluation 10/25/24    Therapy Summary: Pt reported at last session that he has been doing well and returned to recreational activities, chart was held for a month without return.     Discharge Status: goals met      Rehab Discharge Reason: Achieved all and/or the most significant goals(s)

## 2025-05-05 ENCOUNTER — OFFICE VISIT (OUTPATIENT)
Dept: PRIMARY CARE | Facility: CLINIC | Age: 70
End: 2025-05-05
Payer: MEDICARE

## 2025-05-05 VITALS
OXYGEN SATURATION: 97 % | SYSTOLIC BLOOD PRESSURE: 132 MMHG | WEIGHT: 193 LBS | HEART RATE: 84 BPM | DIASTOLIC BLOOD PRESSURE: 66 MMHG | BODY MASS INDEX: 24.78 KG/M2

## 2025-05-05 DIAGNOSIS — F41.8 DEPRESSION WITH ANXIETY: ICD-10-CM

## 2025-05-05 DIAGNOSIS — H02.9 EYELID ABNORMALITY: Primary | ICD-10-CM

## 2025-05-05 PROCEDURE — 99214 OFFICE O/P EST MOD 30 MIN: CPT | Performed by: INTERNAL MEDICINE

## 2025-05-05 PROCEDURE — 1170F FXNL STATUS ASSESSED: CPT | Performed by: INTERNAL MEDICINE

## 2025-05-05 PROCEDURE — G2211 COMPLEX E/M VISIT ADD ON: HCPCS | Performed by: INTERNAL MEDICINE

## 2025-05-05 PROCEDURE — 1036F TOBACCO NON-USER: CPT | Performed by: INTERNAL MEDICINE

## 2025-05-05 PROCEDURE — 1159F MED LIST DOCD IN RCRD: CPT | Performed by: INTERNAL MEDICINE

## 2025-05-05 PROCEDURE — 1157F ADVNC CARE PLAN IN RCRD: CPT | Performed by: INTERNAL MEDICINE

## 2025-05-05 PROCEDURE — 1126F AMNT PAIN NOTED NONE PRSNT: CPT | Performed by: INTERNAL MEDICINE

## 2025-05-05 RX ORDER — BUSPIRONE HYDROCHLORIDE 15 MG/1
TABLET ORAL
Qty: 60 TABLET | Refills: 11 | Status: SHIPPED | OUTPATIENT
Start: 2025-05-05

## 2025-05-05 ASSESSMENT — ENCOUNTER SYMPTOMS
ABDOMINAL PAIN: 0
EYE PAIN: 0
ACTIVITY CHANGE: 0
COUGH: 0
CONSTIPATION: 0
VOMITING: 0
NUMBNESS: 0
COLOR CHANGE: 0
DIZZINESS: 0
DYSURIA: 0
DIARRHEA: 0
CHILLS: 0
CHEST TIGHTNESS: 0
LOSS OF SENSATION IN FEET: 0
FEVER: 0
DEPRESSION: 0
OCCASIONAL FEELINGS OF UNSTEADINESS: 0
FREQUENCY: 0
NAUSEA: 0
PALPITATIONS: 0
ARTHRALGIAS: 0
JOINT SWELLING: 0
FATIGUE: 0
HEADACHES: 0
SHORTNESS OF BREATH: 0

## 2025-05-05 ASSESSMENT — ACTIVITIES OF DAILY LIVING (ADL)
MANAGING_FINANCES: INDEPENDENT
DRESSING: INDEPENDENT
TAKING_MEDICATION: INDEPENDENT
DOING_HOUSEWORK: INDEPENDENT
GROCERY_SHOPPING: INDEPENDENT
BATHING: INDEPENDENT

## 2025-05-05 ASSESSMENT — PATIENT HEALTH QUESTIONNAIRE - PHQ9
SUM OF ALL RESPONSES TO PHQ9 QUESTIONS 1 AND 2: 1
10. IF YOU CHECKED OFF ANY PROBLEMS, HOW DIFFICULT HAVE THESE PROBLEMS MADE IT FOR YOU TO DO YOUR WORK, TAKE CARE OF THINGS AT HOME, OR GET ALONG WITH OTHER PEOPLE: NOT DIFFICULT AT ALL
2. FEELING DOWN, DEPRESSED OR HOPELESS: SEVERAL DAYS
1. LITTLE INTEREST OR PLEASURE IN DOING THINGS: NOT AT ALL

## 2025-05-05 ASSESSMENT — PAIN SCALES - GENERAL: PAINLEVEL_OUTOF10: 0-NO PAIN

## 2025-05-05 NOTE — PROGRESS NOTES
Subjective   Patient ID: Yovani Bledsoe is a 70 y.o. male who presents for Surgical Clearance - eye surgery.    Scheduled for bilat blepheroplasty upper lides 5/13 with Dr Deras    Hyperlipidemia-Lab Results       Component                Value               Date                       LDLCALC                  132 (H)             08/10/2023            Depression with anxiety-taking duloxitine --having a lot more anxiety due to current political situation-having nightmares, worries about money     Exercise--pickle ball 3x/week; [place], hikes and golf summer; [place] swim in [place]        Diet--healthy           Review of Systems   Constitutional:  Negative for activity change, chills, fatigue and fever.   HENT:  Hearing loss: struggling in crowds.    Eyes:  Positive for visual disturbance. Negative for pain.   Respiratory:  Negative for cough, chest tightness and shortness of breath.    Cardiovascular:  Negative for chest pain, palpitations and leg swelling.   Gastrointestinal:  Negative for abdominal pain, constipation, diarrhea, nausea and vomiting.   Genitourinary:  Negative for dysuria and frequency.        Sexual SE from duloxitine   Musculoskeletal:  Negative for arthralgias and joint swelling.   Skin:  Negative for color change and rash.   Neurological:  Negative for dizziness, numbness and headaches.   Psychiatric/Behavioral:          More anxiety       Objective   /66 (BP Location: Left arm, Patient Position: Sitting)   Pulse 84   Wt 87.5 kg (193 lb)   SpO2 97%   BMI 24.78 kg/m²     Physical Exam  Vitals reviewed.   Constitutional:       General: He is not in acute distress.     Appearance: Normal appearance.   Cardiovascular:      Rate and Rhythm: Normal rate and regular rhythm.      Heart sounds: Normal heart sounds. No murmur heard.     No gallop.   Pulmonary:      Breath sounds: Normal breath sounds. No wheezing, rhonchi or rales.   Skin:     General: Skin is  warm and dry.      Findings: No rash.   Neurological:      General: No focal deficit present.      Mental Status: He is alert and oriented to person, place, and time.   Psychiatric:      Comments: nervous         Assessment/Plan   Assessment & Plan  Eyelid abnormality  Cleared for surg       Depression with anxiety    Orders:    busPIRone (Buspar) 15 mg tablet; 1/3 2x/day for 3 days then 1/2 tab 2x/day for 3 days then 2/3 tab 2x/day for 3 days then 1 tab 2x/day  he has tolerated buspar in the past so will add for anxiety and remain on the duloxitine for now

## 2025-05-05 NOTE — ASSESSMENT & PLAN NOTE
Orders:    busPIRone (Buspar) 15 mg tablet; 1/3 2x/day for 3 days then 1/2 tab 2x/day for 3 days then 2/3 tab 2x/day for 3 days then 1 tab 2x/day  he has tolerated buspar in the past so will add for anxiety and remain on the duloxitine for now

## 2025-05-27 ENCOUNTER — TELEMEDICINE (OUTPATIENT)
Dept: PRIMARY CARE | Facility: CLINIC | Age: 70
End: 2025-05-27
Payer: MEDICARE

## 2025-05-27 DIAGNOSIS — F41.8 DEPRESSION WITH ANXIETY: Primary | ICD-10-CM

## 2025-05-27 PROCEDURE — 99212 OFFICE O/P EST SF 10 MIN: CPT | Performed by: INTERNAL MEDICINE

## 2025-05-27 PROCEDURE — 1036F TOBACCO NON-USER: CPT | Performed by: INTERNAL MEDICINE

## 2025-05-27 PROCEDURE — 1159F MED LIST DOCD IN RCRD: CPT | Performed by: INTERNAL MEDICINE

## 2025-05-27 PROCEDURE — 1126F AMNT PAIN NOTED NONE PRSNT: CPT | Performed by: INTERNAL MEDICINE

## 2025-05-27 RX ORDER — BUSPIRONE HYDROCHLORIDE 15 MG/1
TABLET ORAL
Qty: 180 TABLET | Refills: 3 | Status: SHIPPED | OUTPATIENT
Start: 2025-05-27

## 2025-05-27 ASSESSMENT — ENCOUNTER SYMPTOMS
LOSS OF SENSATION IN FEET: 0
DEPRESSION: 0
OCCASIONAL FEELINGS OF UNSTEADINESS: 0

## 2025-05-27 ASSESSMENT — PATIENT HEALTH QUESTIONNAIRE - PHQ9
2. FEELING DOWN, DEPRESSED OR HOPELESS: NOT AT ALL
SUM OF ALL RESPONSES TO PHQ9 QUESTIONS 1 AND 2: 0
1. LITTLE INTEREST OR PLEASURE IN DOING THINGS: NOT AT ALL

## 2025-05-27 ASSESSMENT — PAIN SCALES - GENERAL: PAINLEVEL_OUTOF10: 0-NO PAIN

## 2025-05-27 NOTE — ASSESSMENT & PLAN NOTE
Orders:    busPIRone (Buspar) 15 mg tablet; 2x/day  will try off duloxitine due to side effects-agrees to call if symptoms worsen or return

## 2025-05-27 NOTE — PROGRESS NOTES
Subjective   Patient ID: Yovani Bledsoe is a 70 y.o. male who presents for Follow-up.    Virtual or Telephone Consent    While technically available, the patient was unable or unwilling to consent to connect via audio/video telehealth technology; therefore, I performed this visit using a real-time audio only connection between Yovani Bledsoe & Julissa Urbina MD.  Verbal consent was requested and obtained from Yovani Bledsoe on this date, 05/27/25 for a telehealth visit and the patient's location was confirmed at the time of the visit.  Total time 8 min    Anxiety-has begun on buspar-gradually increased dose to whole tab last week--anxiety seems in good control and hasn't noticed any SE             Objective   There were no vitals taken for this visit. None due to televisit        Assessment/Plan   Assessment & Plan  Depression with anxiety    Orders:    busPIRone (Buspar) 15 mg tablet; 2x/day  will try off duloxitine due to side effects-agrees to call if symptoms worsen or return

## 2025-08-05 ENCOUNTER — PATIENT MESSAGE (OUTPATIENT)
Dept: PRIMARY CARE | Facility: CLINIC | Age: 70
End: 2025-08-05
Payer: MEDICARE

## 2025-08-05 NOTE — TELEPHONE ENCOUNTER
Patient declined televisit for Paxlovid at this time. Advised to treat symptoms with OTC medication and call office in a few days if symptoms worsen, or go to ED for high fever, vomiting, SOB.

## 2025-09-05 ENCOUNTER — OFFICE VISIT (OUTPATIENT)
Dept: PRIMARY CARE | Facility: CLINIC | Age: 70
End: 2025-09-05
Payer: MEDICARE

## 2025-09-05 VITALS
WEIGHT: 194 LBS | OXYGEN SATURATION: 98 % | HEART RATE: 46 BPM | BODY MASS INDEX: 24.9 KG/M2 | HEIGHT: 74 IN | DIASTOLIC BLOOD PRESSURE: 74 MMHG | SYSTOLIC BLOOD PRESSURE: 108 MMHG

## 2025-09-05 DIAGNOSIS — S39.012A STRAIN OF LUMBAR REGION, INITIAL ENCOUNTER: Primary | ICD-10-CM

## 2025-09-05 PROCEDURE — 1125F AMNT PAIN NOTED PAIN PRSNT: CPT | Performed by: INTERNAL MEDICINE

## 2025-09-05 PROCEDURE — 99214 OFFICE O/P EST MOD 30 MIN: CPT | Performed by: INTERNAL MEDICINE

## 2025-09-05 PROCEDURE — 3008F BODY MASS INDEX DOCD: CPT | Performed by: INTERNAL MEDICINE

## 2025-09-05 PROCEDURE — 1036F TOBACCO NON-USER: CPT | Performed by: INTERNAL MEDICINE

## 2025-09-05 PROCEDURE — 1159F MED LIST DOCD IN RCRD: CPT | Performed by: INTERNAL MEDICINE

## 2025-09-05 RX ORDER — CYCLOBENZAPRINE HCL 10 MG
10 TABLET ORAL NIGHTLY PRN
Qty: 30 TABLET | Refills: 0 | Status: SHIPPED | OUTPATIENT
Start: 2025-09-05 | End: 2025-11-04

## 2025-09-05 RX ORDER — MELOXICAM 15 MG/1
15 TABLET ORAL DAILY
Qty: 30 TABLET | Refills: 1 | Status: SHIPPED | OUTPATIENT
Start: 2025-09-05 | End: 2026-09-05

## 2025-09-05 ASSESSMENT — ENCOUNTER SYMPTOMS
NUMBNESS: 0
WEAKNESS: 0
BACK PAIN: 1

## 2025-09-05 ASSESSMENT — PAIN SCALES - GENERAL: PAINLEVEL_OUTOF10: 2

## 2025-11-14 ENCOUNTER — APPOINTMENT (OUTPATIENT)
Dept: PRIMARY CARE | Facility: CLINIC | Age: 70
End: 2025-11-14
Payer: MEDICARE